# Patient Record
Sex: FEMALE | Race: WHITE | Employment: FULL TIME | URBAN - NONMETROPOLITAN AREA
[De-identification: names, ages, dates, MRNs, and addresses within clinical notes are randomized per-mention and may not be internally consistent; named-entity substitution may affect disease eponyms.]

---

## 2021-03-17 PROBLEM — M75.31 CALCIFIC TENDINITIS OF RIGHT SHOULDER: Status: ACTIVE | Noted: 2021-03-17

## 2023-01-31 PROBLEM — I47.1 PAROXYSMAL SUPRAVENTRICULAR TACHYCARDIA (HCC): Status: ACTIVE | Noted: 2023-01-31

## 2023-02-07 ENCOUNTER — TELEPHONE (OUTPATIENT)
Dept: CARDIOTHORACIC SURGERY | Age: 43
End: 2023-02-07

## 2023-02-07 NOTE — TELEPHONE ENCOUNTER
Ref from [facility]  PSVT     Patient is to schedule the Echo at [facility] , then call me . I will schedule the apt with Charlie after echo.

## 2023-02-28 NOTE — PROGRESS NOTES
[address] (EP)  [address] [facility]  Dept: [phone]  Cardiac Electrophysiology: Consultation Note  Patient's demographics:  Date:   3/1/2023  Patient name:              Samra Gomez  YOB: 1980  Sex:    female   MRN:   [id number]    Primary Care Physician:  Esthela Mantilla MD    Cardiologist:  Cassidy Molina MD [facility]). Reason for Consultation:  Supraventricular tachycardia    Dear Dr. Brennan Joseph: I had a pleasure of seeing Radha Maier today in the EP clinic at [facility]. As you know she is in no she is a very pleasant 38years old lady,  who was having her lunch on 2/15/2023 and all of a sudden started to experience palpitation and dizziness. This lasted for about 20 to 25 minutes. She reported to the local emergency room as she was noted to be in narrow complex tachycardia. She spontaneously converted to sinus rhythm. She had an echocardiogram that showed preserved left ventricular size and function. She was referred here for evaluation of EP study/catheter ablation. The patient has remained well so far. No prior history of palpitations or documented SVT. Otherwise healthy. No chest pain, shortness of breath, syncope. No family history of arrhythmias or sudden cardiac death. She is a non-smoker and drinks alcohol socially. Denies any recreational drugs or high energy drinks.  with 3 children. Medical Hx: SVT dx 2/15/23 (self terminated), abnormal Pap smear, Eczema, and Herpes zoster. Review of systems:  Constitutional: Negative for chills and fever  HENT: Negative for congestion, sinus pressure, sneezing and sore throat. Eyes: Negative for pain, discharge, redness and itching. Respiratory: Negative for apnea, cough  Gastrointestinal: Negative for blood in stool, constipation, diarrhea   Endocrine: Negative for cold intolerance, heat intolerance, polydipsia.   Genitourinary: Negative for dysuria, enuresis, flank pain and hematuria. Musculoskeletal: Negative for arthralgias, joint swelling and neck pain. Neurological: Negative for numbness and headaches. Psychiatric/Behavioral: Negative for agitation, confusion, decreased concentration and dysphoric mood. Past Medical History[de-identified]  Past Medical History:   Diagnosis Date    Abnormal Pap smear of cervix     19 - ASCUS - neg HPV - repeat 6 months per Dr. Ofelia Monet. 3/5/18 - atypical glandular cells -endo bx per Dr. Ofelia Monet. 3/29/18 - endo bx neg - repeat PAP 2018 per Dr. Ofelia Monet. 20 - negative PAP, repeat 6 months per Dr. Trish Patino. 21 - negative #2 - repeat PAP 6 months. Eczema     Herpes zoster        Past Surgical History:  Past Surgical History:   Procedure Laterality Date     SECTION      DILATION AND CURETTAGE      ENDOMETRIAL ABLATION      KNEE SURGERY      ROTATOR CUFF REPAIR          TUBAL LIGATION         Family History:  Family History   Problem Relation Age of Onset    Cancer Maternal Grandmother         uterine    Heart Disease Maternal Grandfather     Hypertension Mother     Colon Cancer Father         5 years        Social History:  Social History     Socioeconomic History    Marital status:    Tobacco Use    Smoking status: Never    Smokeless tobacco: Never   Vaping Use    Vaping Use: Never used   Substance and Sexual Activity    Alcohol use: Yes     Comment: weekly    Drug use: Never        Allergies:   Allergies   Allergen Reactions    Morphine Hives and Itching        Medications:  Current Outpatient Medications   Medication Sig Dispense Refill    medroxyPROGESTERone (DEPO-PROVERA) 150 MG/ML injection Medroxyprogesterone (Depo-Provera) 150 mg/mL Suspension Active 150 MG IM EVERY 3 MONTHS 2023 12:00am      estradiol (ESTRACE VAGINAL) 0.1 MG/GM vaginal cream Apply externally to affected area at bedtime for 7 to 10 nights as needed 42.5 g 1     No current facility-administered medications for this visit. Physical Examination:  Vitals:    03/01/23 1012   BP: 132/89   Pulse: 94   SpO2: 98%     Body mass index is 27.25 kg/m². GENERAL: Alert and oriented. No distress. EYES: No pallor or icterus. ENT: No cyanosis. No thyromegaly or cervical LAP. VESSELS: No jugular venous distension or carotid bruits. HEART: Normal S1/S2. No murmur, rub or gallop. LUNGS: Clear to auscultation. ABDOMEN: Soft and non-tender. EXTREMITIES: No lower extremity edema. Feet are warm. NEUROLOGICAL: Grossly normal.     Laboratory And Diagnostic Data  I have personally reviewed and interpreted the results of the following diagnostic testing    Lab Results   Component Value Date    CHOL 143 12/15/2021    TRIG 92 12/15/2021     12/15/2021    TSH 0.482 02/07/2023     Echocardiogram 2/17/2023: LVEF 55%, LVWT 0.9 cm, LAD/MIKE 2.9 cm. No significant valvular abnormalities. ECG ECG 2/15/2023: Narrow complex tachycardia, short RP rate 187 bpm.  ECG 3/1/2023: Sinus rhythm. Normal ECG. Impression:  Narrow complex tachycardia, consistent with AVNRT. Assessment And Recommendations: The patient had single episode of narrow complex tachycardia that terminated spontaneously. Discussed management options including initiation of beta-blockers, antiarrhythmic drugs versus EP study/SVT ablation. The patient at this time wants to watch and is not interested in any form of medical therapy or catheter-based procedures. Discussed the importance of protective measures during the episodes of SVT. Discussed maneuvers to help to terminate the episodes. She will follow-up with Dr. Lanie Alvarenga. Thank you Dr. Lanie Alvarenga for allowing me to participate in the care of your patient. Please call me if you have any questions. **This report has been created using voice recognition software. It may contain minor errors which are inherent in voice recognition technology. **       Sandra Hillman MD, MRCP, Rudolfo Ormond on 3/1/2023 at 10:50 AM

## 2023-03-01 ENCOUNTER — OFFICE VISIT (OUTPATIENT)
Dept: CARDIOLOGY CLINIC | Age: 43
End: 2023-03-01
Payer: COMMERCIAL

## 2023-03-01 VITALS
OXYGEN SATURATION: 98 % | DIASTOLIC BLOOD PRESSURE: 89 MMHG | HEART RATE: 94 BPM | HEIGHT: 62 IN | SYSTOLIC BLOOD PRESSURE: 132 MMHG | WEIGHT: 149 LBS | BODY MASS INDEX: 27.42 KG/M2

## 2023-03-01 DIAGNOSIS — I47.1 PAROXYSMAL SUPRAVENTRICULAR TACHYCARDIA (HCC): Primary | ICD-10-CM

## 2023-03-01 PROCEDURE — G8484 FLU IMMUNIZE NO ADMIN: HCPCS | Performed by: INTERNAL MEDICINE

## 2023-03-01 PROCEDURE — G8419 CALC BMI OUT NRM PARAM NOF/U: HCPCS | Performed by: INTERNAL MEDICINE

## 2023-03-01 PROCEDURE — 93000 ELECTROCARDIOGRAM COMPLETE: CPT | Performed by: INTERNAL MEDICINE

## 2023-03-01 PROCEDURE — G8427 DOCREV CUR MEDS BY ELIG CLIN: HCPCS | Performed by: INTERNAL MEDICINE

## 2023-03-01 PROCEDURE — 99204 OFFICE O/P NEW MOD 45 MIN: CPT | Performed by: INTERNAL MEDICINE

## 2023-03-01 PROCEDURE — 1036F TOBACCO NON-USER: CPT | Performed by: INTERNAL MEDICINE
